# Patient Record
Sex: MALE | Race: BLACK OR AFRICAN AMERICAN | NOT HISPANIC OR LATINO | Employment: OTHER | URBAN - METROPOLITAN AREA
[De-identification: names, ages, dates, MRNs, and addresses within clinical notes are randomized per-mention and may not be internally consistent; named-entity substitution may affect disease eponyms.]

---

## 2024-01-30 ENCOUNTER — HOSPITAL ENCOUNTER (EMERGENCY)
Facility: HOSPITAL | Age: 34
Discharge: HOME | End: 2024-01-30
Payer: MEDICAID

## 2024-01-30 ENCOUNTER — APPOINTMENT (OUTPATIENT)
Dept: RADIOLOGY | Facility: HOSPITAL | Age: 34
End: 2024-01-30
Payer: MEDICAID

## 2024-01-30 VITALS
RESPIRATION RATE: 17 BRPM | WEIGHT: 86 LBS | OXYGEN SATURATION: 99 % | HEART RATE: 90 BPM | HEIGHT: 60 IN | SYSTOLIC BLOOD PRESSURE: 97 MMHG | DIASTOLIC BLOOD PRESSURE: 68 MMHG | BODY MASS INDEX: 16.88 KG/M2 | TEMPERATURE: 97.2 F

## 2024-01-30 DIAGNOSIS — S00.83XA CONTUSION OF FACE, INITIAL ENCOUNTER: Primary | ICD-10-CM

## 2024-01-30 PROCEDURE — 72125 CT NECK SPINE W/O DYE: CPT | Performed by: RADIOLOGY

## 2024-01-30 PROCEDURE — 2500000004 HC RX 250 GENERAL PHARMACY W/ HCPCS (ALT 636 FOR OP/ED): Performed by: PHYSICIAN ASSISTANT

## 2024-01-30 PROCEDURE — 70450 CT HEAD/BRAIN W/O DYE: CPT

## 2024-01-30 PROCEDURE — 70450 CT HEAD/BRAIN W/O DYE: CPT | Performed by: RADIOLOGY

## 2024-01-30 PROCEDURE — 72125 CT NECK SPINE W/O DYE: CPT

## 2024-01-30 PROCEDURE — 76377 3D RENDER W/INTRP POSTPROCES: CPT

## 2024-01-30 PROCEDURE — 70486 CT MAXILLOFACIAL W/O DYE: CPT

## 2024-01-30 PROCEDURE — 99285 EMERGENCY DEPT VISIT HI MDM: CPT

## 2024-01-30 PROCEDURE — 76377 3D RENDER W/INTRP POSTPROCES: CPT | Performed by: RADIOLOGY

## 2024-01-30 PROCEDURE — 70486 CT MAXILLOFACIAL W/O DYE: CPT | Performed by: RADIOLOGY

## 2024-01-30 PROCEDURE — 96372 THER/PROPH/DIAG INJ SC/IM: CPT

## 2024-01-30 RX ORDER — LORAZEPAM 1 MG/1
1 TABLET ORAL ONCE
Status: DISCONTINUED | OUTPATIENT
Start: 2024-01-30 | End: 2024-01-30

## 2024-01-30 RX ORDER — ACETAMINOPHEN 160 MG/5ML
650 SUSPENSION ORAL ONCE
Status: DISCONTINUED | OUTPATIENT
Start: 2024-01-30 | End: 2024-01-30

## 2024-01-30 RX ORDER — LORAZEPAM 2 MG/ML
1 INJECTION INTRAMUSCULAR ONCE
Status: COMPLETED | OUTPATIENT
Start: 2024-01-30 | End: 2024-01-30

## 2024-01-30 RX ADMIN — LORAZEPAM 1 MG: 2 INJECTION INTRAMUSCULAR; INTRAVENOUS at 12:02

## 2024-01-30 ASSESSMENT — COLUMBIA-SUICIDE SEVERITY RATING SCALE - C-SSRS
2. HAVE YOU ACTUALLY HAD ANY THOUGHTS OF KILLING YOURSELF?: NO
1. IN THE PAST MONTH, HAVE YOU WISHED YOU WERE DEAD OR WISHED YOU COULD GO TO SLEEP AND NOT WAKE UP?: NO
6. HAVE YOU EVER DONE ANYTHING, STARTED TO DO ANYTHING, OR PREPARED TO DO ANYTHING TO END YOUR LIFE?: NO

## 2024-01-30 ASSESSMENT — PAIN - FUNCTIONAL ASSESSMENT: PAIN_FUNCTIONAL_ASSESSMENT: 0-10

## 2024-01-30 NOTE — ED TRIAGE NOTES
PT IS NON VERBAL, REPORTS TO ED WITH C/O FALLING DOWN 3 CARPETED STAIRS, DENIES LOC, C/O ABRASION TO LOWER LIP, ECCHYMOSIS UNDER LEFT EYE

## 2024-01-30 NOTE — ED PROVIDER NOTES
HPI   Chief Complaint   Patient presents with    Fall       Patient has history of developmental delay and most of history was obtained by staff member at care facility.    History of present illness:  33 year old male with a PMHx of severe mental retardation, nonverbal, anxiety disorder, and muscle weakness presents after a fall, resulting in a roughly 3 cm hematoma beneath his left eye and abrasion on his lower lip. Patient lives in a residential group home and had an unwitnessed fall in his wheelchair down three carpeted steps. Caretaker at the home heard the fall and found the patient crying, with no LOC. Caretaker unsure if patient hit his head. Per caretaker, the patient seems to be at his baseline mentation, is in no acute distress, and did not vomit. On exam, patient is interactive, but unable to communicate if he is in pain. He has not taken any pain medications for the fall this morning, but has taken his daily morning meds.    Review of systems: Constitutional, eye, ENT, cardiovascular, respiratory, gastrointestinal, genitourinary, neurologic, musculoskeletal, dermatologic, hematologic, endocrine systems were evaluated and were negative unless otherwise specified in history of present illness.    Medications: Reviewed and per nursing note.    Family history: Denies relevant medical conditions.    Social history: Denies tobacco, alcohol, drug use.      Physical exam:    Appearance: Wheelchair-bound, nontoxic-appearing, alert and limited communication.    HEENT: Tenderness ecchymosis left maxilla.  Microcephaly, extraocular movements intact, pupils equal round reactive to light, mucous membranes are moist and pink.  No hemotympanum.    NECK:  Nml Inspection, no meningismus, no thyromegaly, no lymphadenopathy, no JVD, trachea is midline.    Respiratory: Clear to auscultation bilaterally with normal bilateral excursion. No wheezes, rhonchi, crackles.    Cardiovascular: Regular rate and rhythm, no murmurs,  rubs or gallops. Pulses 2+ symmetrically in the dorsalis pedis and radial pulses.    Abdomen/GI:  Soft, nontender, nondistended, normal bowel sounds x4. No masses or organomegaly.  PEG tube intact.    :  No CVA tenderness    Neuro:  Alert, cranial nerves grossly intact. Normal sensation to light touch in all 4 extremities.    Musculoskeletal: Patient spontaneously moves all 4 extremities.    Skin: Ecchymosis left maxilla.  No cyanosis, clubbing, edema, open wounds, or rashes.                          No data recorded                Patient History   No past medical history on file.  No past surgical history on file.  No family history on file.  Social History     Tobacco Use    Smoking status: Not on file    Smokeless tobacco: Not on file   Substance Use Topics    Alcohol use: Not on file    Drug use: Not on file       Physical Exam   ED Triage Vitals [01/30/24 0900]   Temperature Heart Rate Respirations BP   36.2 °C (97.2 °F) 90 17 97/68      Pulse Ox Temp src Heart Rate Source Patient Position   99 % -- -- --      BP Location FiO2 (%)     -- --       Physical Exam    ED Course & MDM   Diagnoses as of 01/30/24 1534   Contusion of face, initial encounter       Medical Decision Making  CT head wo IV contrast   Final Result    Significantly limited examination due to motion artifact. No obvious    acute intracranial injury detected.          Acute appearing fracture of the right 4th maxillary tooth with    associated periapical lucency suggestive of tooth loosening.          MACRO:    None          Signed by: Samy Benson 1/30/2024 2:35 PM    Dictation workstation:   [id number]     CT maxillofacial bones wo IV contrast   Final Result    Significantly limited examination due to motion artifact. No obvious    acute intracranial injury detected.          Acute appearing fracture of the right 4th maxillary tooth with    associated periapical lucency suggestive of tooth loosening.          MACRO:    None           Signed by: Samy Benson 1/30/2024 2:35 PM    Dictation workstation:   [id number]     CT cervical spine wo IV contrast   Final Result    Multilevel spondylosis without acute osseous abnormality of the    cervical spine.          MACRO:    None          Signed by: Samy Benson 1/30/2024 2:37 PM    Dictation workstation:   [id number]     CT 3D reconstruction   Final Result    Significantly limited examination due to motion artifact. No obvious    acute intracranial injury detected.          Acute appearing fracture of the right 4th maxillary tooth with    associated periapical lucency suggestive of tooth loosening.          MACRO:    None          Signed by: Samy Benson 1/30/2024 2:35 PM    Dictation workstation:   [id number]         Patient presents with both fall that occurred earlier today.  He has struck his face.  This was unwitnessed.  He is wheelchair-bound with cognitive impairment.  CT head cervical spine facial bones ordered.  Study reveals right tooth fracture which may be chronic and unrelated to injury.  No intracranial hemorrhage or skull fracture.  Recommend him taking his as needed Tylenol as needed for pain.    Patient will be discharged to group home.  Patient is educated in signs and symptoms of worsening symptoms and reasons to come back to the emergency department.  Will need to follow up with primary care provider.  Patient does not report social determinants of health impacting ability to obtain care that is needed.  Patient agrees with plan.    This is a transcription.  Text was reviewed for errors, but some transcription errors may remain.  Please call for any questions.          Procedure  Procedures     Eduardo Salamanca PA-C  01/30/24 1534       Eduardo Salamanca PA-C  01/30/24 1534

## 2024-06-25 ENCOUNTER — HOSPITAL ENCOUNTER (EMERGENCY)
Facility: HOSPITAL | Age: 34
Discharge: HOME | End: 2024-06-26
Attending: EMERGENCY MEDICINE
Payer: MEDICAID

## 2024-06-25 DIAGNOSIS — Z93.1 STATUS POST INSERTION OF PERCUTANEOUS ENDOSCOPIC GASTROSTOMY (PEG) TUBE (MULTI): Primary | ICD-10-CM

## 2024-06-25 PROCEDURE — 99283 EMERGENCY DEPT VISIT LOW MDM: CPT

## 2024-06-26 ENCOUNTER — APPOINTMENT (OUTPATIENT)
Dept: RADIOLOGY | Facility: HOSPITAL | Age: 34
End: 2024-06-26
Payer: MEDICAID

## 2024-06-26 VITALS
DIASTOLIC BLOOD PRESSURE: 101 MMHG | BODY MASS INDEX: 16.88 KG/M2 | HEIGHT: 60 IN | TEMPERATURE: 97.2 F | HEART RATE: 98 BPM | RESPIRATION RATE: 18 BRPM | SYSTOLIC BLOOD PRESSURE: 155 MMHG | OXYGEN SATURATION: 95 % | WEIGHT: 86 LBS

## 2024-06-26 PROCEDURE — 74018 RADEX ABDOMEN 1 VIEW: CPT

## 2024-06-26 PROCEDURE — 74018 RADEX ABDOMEN 1 VIEW: CPT | Performed by: STUDENT IN AN ORGANIZED HEALTH CARE EDUCATION/TRAINING PROGRAM

## 2024-06-26 PROCEDURE — 2550000001 HC RX 255 CONTRASTS: Performed by: EMERGENCY MEDICINE

## 2024-06-26 NOTE — ED PROVIDER NOTES
HPI   Chief Complaint   Patient presents with   • PEG tube dislodgement        This is a 33-year-old man with past medical history of cerebral palsy does present with PEG tube dislodgment.  No fever or chills.  No other complaints.  No fevers reported.                        No data recorded                   Patient History   No past medical history on file.  No past surgical history on file.  No family history on file.  Social History     Tobacco Use   • Smoking status: Not on file   • Smokeless tobacco: Not on file   Substance Use Topics   • Alcohol use: Not on file   • Drug use: Not on file       Physical Exam   ED Triage Vitals [06/25/24 2216]   Temperature Heart Rate Respirations BP   36.2 °C (97.2 °F) (!) 102 18 (!) 156/136      Pulse Ox Temp Source Heart Rate Source Patient Position   (!) 93 % Temporal Monitor Lying      BP Location FiO2 (%)     Right arm --       Physical Exam  Vitals and nursing note reviewed.   Constitutional:       Comments: Positive cerebral palsy nonverbal   HENT:      Head:      Comments: Cerebral palsy     Nose: Nose normal.      Mouth/Throat:      Mouth: Mucous membranes are moist.      Pharynx: Oropharynx is clear.   Eyes:      Extraocular Movements: Extraocular movements intact.      Conjunctiva/sclera: Conjunctivae normal.      Pupils: Pupils are equal, round, and reactive to light.   Cardiovascular:      Rate and Rhythm: Normal rate and regular rhythm.      Pulses: Normal pulses.      Heart sounds: Normal heart sounds.   Pulmonary:      Effort: Pulmonary effort is normal.      Breath sounds: Normal breath sounds.   Abdominal:      General: Abdomen is flat. Bowel sounds are normal. There is no distension.      Tenderness: There is no abdominal tenderness. There is no right CVA tenderness, left CVA tenderness, guarding or rebound.   Musculoskeletal:         General: Normal range of motion.      Cervical back: Normal range of motion and neck supple.   Skin:     General: Skin is  warm and dry.      Capillary Refill: Capillary refill takes less than 2 seconds.   Neurological:      Mental Status: He is alert.      Sensory: No sensory deficit.      Motor: No weakness.      Comments: Cerebral palsy nonverbal not following commands   Psychiatric:      Comments: Cerebral palsy and not following commands         ED Course & MDM   Diagnoses as of 06/26/24 0040   Status post insertion of percutaneous endoscopic gastrostomy (PEG) tube (Multi)       Medical Decision Making  PEG tube is replaced.  X-ray is obtained to show confirmation of placement.  This is in place.  Patient safely discharged back to facility.    Amount and/or Complexity of Data Reviewed  Radiology: ordered. Decision-making details documented in ED Course.        Procedure  Procedures     James Aguirre MD  06/26/24 0040

## 2025-03-04 ENCOUNTER — HOSPITAL ENCOUNTER (EMERGENCY)
Facility: HOSPITAL | Age: 35
Discharge: HOME | End: 2025-03-04
Attending: EMERGENCY MEDICINE
Payer: MEDICAID

## 2025-03-04 ENCOUNTER — APPOINTMENT (OUTPATIENT)
Dept: RADIOLOGY | Facility: HOSPITAL | Age: 35
End: 2025-03-04
Payer: MEDICAID

## 2025-03-04 VITALS
TEMPERATURE: 98.6 F | HEART RATE: 120 BPM | DIASTOLIC BLOOD PRESSURE: 75 MMHG | HEIGHT: 60 IN | OXYGEN SATURATION: 94 % | BODY MASS INDEX: 17.28 KG/M2 | SYSTOLIC BLOOD PRESSURE: 106 MMHG | RESPIRATION RATE: 18 BRPM | WEIGHT: 88 LBS
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
## 2025-03-04 DIAGNOSIS — S01.01XA SCALP LACERATION, INITIAL ENCOUNTER: ICD-10-CM

## 2025-03-04 DIAGNOSIS — T14.8XXA STAPLED SKIN WOUND: ICD-10-CM

## 2025-03-04 PROCEDURE — 96372 THER/PROPH/DIAG INJ SC/IM: CPT | Mod: 59 | Performed by: EMERGENCY MEDICINE

## 2025-03-04 PROCEDURE — 2500000004 HC RX 250 GENERAL PHARMACY W/ HCPCS (ALT 636 FOR OP/ED): Performed by: EMERGENCY MEDICINE

## 2025-03-04 PROCEDURE — 70450 CT HEAD/BRAIN W/O DYE: CPT | Performed by: STUDENT IN AN ORGANIZED HEALTH CARE EDUCATION/TRAINING PROGRAM

## 2025-03-04 PROCEDURE — 99284 EMERGENCY DEPT VISIT MOD MDM: CPT | Mod: 25 | Performed by: EMERGENCY MEDICINE

## 2025-03-04 PROCEDURE — 12001 RPR S/N/AX/GEN/TRNK 2.5CM/<: CPT | Performed by: EMERGENCY MEDICINE

## 2025-03-04 PROCEDURE — 70450 CT HEAD/BRAIN W/O DYE: CPT

## 2025-03-04 RX ORDER — LORAZEPAM 2 MG/ML
1 INJECTION INTRAMUSCULAR ONCE
Status: COMPLETED | OUTPATIENT
Start: 2025-03-04 | End: 2025-03-04

## 2025-03-04 RX ORDER — LORAZEPAM 1 MG/1
1 TABLET ORAL ONCE
Status: DISCONTINUED | OUTPATIENT
Start: 2025-03-04 | End: 2025-03-04

## 2025-03-04 RX ADMIN — LORAZEPAM 1 MG: 2 INJECTION INTRAMUSCULAR; INTRAVENOUS at 10:01

## 2025-03-04 ASSESSMENT — PAIN - FUNCTIONAL ASSESSMENT: PAIN_FUNCTIONAL_ASSESSMENT: WONG-BAKER FACES

## 2025-03-04 ASSESSMENT — PAIN SCALES - WONG BAKER: WONGBAKER_NUMERICALRESPONSE: NO HURT

## 2025-03-04 NOTE — ED TRIAGE NOTES
Pt to ED from home via EMS after fall out of bed this am. Pt with head laceration. Per caregiver no LOC, thinners. Pt with hx of cerebral palsy, non verbal,

## 2025-03-04 NOTE — ED PROVIDER NOTES
HPI   Chief Complaint   Patient presents with    Fall       HPI: []  34-year-old -American male with a developmental delay, has a G-tube also comes in with a laceration to his scalp.  Caregiver states that he hit his right forehead against something today.  No LOC.  No neck pain.  No change in mental status.  No fever no chills no nausea Medary no cough congestion no incontinence no seizures.  No blood thinners.    Past history: Developmental delay and congenital abnormalities  Social: Noncontributory.  REVIEW OF SYSTEMS:    GENERAL.: No weight loss, fatigue, anorexia, insomnia, fever.    EYES: No vision loss, double vision, drainage, eye pain.    ENT: No pharyngitis, dry mouth.    CARDIOPULMONARY: No chest pain, palpitations, syncope, near syncope. No shortness of breath, cough, hemoptysis.    GI: No abdominal pain, change in bowel habits, melena, hematemesis, hematochezia, nausea, vomiting, diarrhea.    : No discharge, dysuria, frequency, urgency, hematuria.    MS: No limb pain, joint pain, joint swelling.    SKIN: Positive for laceration    PSYCH: No depression, anxiety, suicidality, homicidality.    Review of systems is otherwise negative unless stated above or in history of present illness.  Social history, family history, allergies reviewed.  PHYSICAL EXAM:    GENERAL: Vitals noted, no distress. Alert and oriented  x 0 at his baseline with obvious features of congenital developmental delay and abnormalities non-toxic.      EENT: TMs clear. Posterior oropharynx unremarkable. No meningismus. No LAD.  Negative hemotympanum negative Lowe sign negative mastoid tenderness    NECK: Supple. Nontender. No midline tenderness.     CARDIAC: Regular, rate, rhythm. No murmurs rubs or gallops. No JVD    PULMONARY: Lungs clear bilaterally with good aeration. No wheezes rales or rhonchi. No respiratory distress.     ABDOMEN: Soft, nonsurgical. Nontender. No peritoneal signs. Normoactive bowel sounds. No pulsatile  masses.     EXTREMITIES: No peripheral edema. Negative Homans bilaterally, no cords.  2+ bounding pulses well-perfused.    SKIN: 2 cm linear laceration right frontal scalp area.    NEURO: Due to his past history detailed exam not possible but grossly he has no focal neurodeficits.    MEDICAL DECISION MAKING:  CT head done which is negative  Treatment ED: Patient was given lorazepam for the CAT scan and following that he is laceration was cleansed thoroughly with Betadine and it was stapled with 3 Intrepid staples with good good approximation.      ED course: Patient remained stable hemodynamic.  Impression: #1 scalp laceration status post repair with staples  Plan/MDM: 34-year-old male comes in with a scalp laceration after hit his head against of an object.  Currently low suspicion for traumatic injury to C-spine T-spine L-spine or low concern for infection dehydration sepsis or stroke or TIA laceration repaired with staples will be discharged home local wound care and outpatient follow-up with primary care doctor with strict return precaution.              Patient History   No past medical history on file.  No past surgical history on file.  No family history on file.  Social History     Tobacco Use    Smoking status: Not on file    Smokeless tobacco: Not on file   Substance Use Topics    Alcohol use: Not on file    Drug use: Not on file       Physical Exam   ED Triage Vitals [03/04/25 0730]   Temperature Heart Rate Respirations BP   37 °C (98.6 °F) (!) 120 18 99/60      Pulse Ox Temp src Heart Rate Source Patient Position   94 % -- -- --      BP Location FiO2 (%)     -- --       Physical Exam      ED Course & MDM   ED Course as of 03/04/25 1250   Tue Mar 04, 2025   1246 CT head negative, patient's laceration was stapled with 3 staples will be discharged home with supportive care local wound care close outpatient follow-up and strict return precaution. [MT]      ED Course User Index  [MT] Aparna Swanson MD          Diagnoses as of 03/04/25 1250   Fall, initial encounter   Scalp laceration, initial encounter   Stapled skin wound                 No data recorded     [place] Coma Scale Score: 15 (03/04/25 0733 : Rehana See RN)                           Medical Decision Making      Procedure  Laceration Repair    Performed by: Aparna Swanson MD  Authorized by: Aparna Swanson MD    Consent:     Consent obtained:  Emergent situation    Consent given by:  Guardian    Risks, benefits, and alternatives were discussed: yes      Risks discussed:  Infection, pain, retained foreign body, tendon damage, poor cosmetic result, need for additional repair, nerve damage, poor wound healing and vascular damage    Alternatives discussed:  No treatment, delayed treatment, observation and referral  Universal protocol:     Procedure explained and questions answered to patient or proxy's satisfaction: yes      Relevant documents present and verified: yes      Test results available: yes      Imaging studies available: yes      Required blood products, implants, devices, and special equipment available: yes      Site/side marked: yes      Immediately prior to procedure, a time out was called: yes      Patient identity confirmed:  Verbally with patient, arm band and hospital-assigned identification number  Anesthesia:     Anesthesia method:  None  Laceration details:     Location:  Scalp    Scalp location:  Frontal    Length (cm):  2    Depth (mm):  5  Pre-procedure details:     Preparation:  Patient was prepped and draped in usual sterile fashion and imaging obtained to evaluate for foreign bodies  Exploration:     Limited defect created (wound extended): no      Imaging obtained comment:  Ct head    Imaging outcome: foreign body not noted      Wound exploration: entire depth of wound visualized      Wound extent: no areolar tissue violation noted, no fascia violation noted, no foreign bodies/material noted, no muscle damage noted, no  nerve damage noted, no tendon damage noted, no underlying fracture noted and no vascular damage noted      Contaminated: no    Treatment:     Area cleansed with:  Povidone-iodine    Amount of cleaning:  Standard    Debridement:  None    Undermining:  None    Scar revision: no      Layers repaired: skin and subcuataneous tissue.  Skin repair:     Repair method:  Staples    Number of staples:  3  Approximation:     Approximation:  Close  Repair type:     Repair type:  Simple  Post-procedure details:     Dressing:  Open (no dressing)    Procedure completion:  Tolerated       Aparna Swanson MD  03/04/25 9853
